# Patient Record
Sex: MALE | Race: WHITE | URBAN - METROPOLITAN AREA
[De-identification: names, ages, dates, MRNs, and addresses within clinical notes are randomized per-mention and may not be internally consistent; named-entity substitution may affect disease eponyms.]

---

## 2021-06-28 ENCOUNTER — OUTPATIENT (OUTPATIENT)
Dept: OUTPATIENT SERVICES | Facility: HOSPITAL | Age: 56
LOS: 1 days | End: 2021-06-28
Payer: OTHER MISCELLANEOUS

## 2021-06-28 VITALS
DIASTOLIC BLOOD PRESSURE: 83 MMHG | OXYGEN SATURATION: 99 % | WEIGHT: 173.06 LBS | SYSTOLIC BLOOD PRESSURE: 125 MMHG | HEIGHT: 67 IN | TEMPERATURE: 98 F | RESPIRATION RATE: 18 BRPM | HEART RATE: 65 BPM

## 2021-06-28 DIAGNOSIS — Z01.818 ENCOUNTER FOR OTHER PREPROCEDURAL EXAMINATION: ICD-10-CM

## 2021-06-28 DIAGNOSIS — Z98.890 OTHER SPECIFIED POSTPROCEDURAL STATES: Chronic | ICD-10-CM

## 2021-06-28 DIAGNOSIS — M75.122 COMPLETE ROTATOR CUFF TEAR OR RUPTURE OF LEFT SHOULDER, NOT SPECIFIED AS TRAUMATIC: ICD-10-CM

## 2021-06-28 DIAGNOSIS — Z90.49 ACQUIRED ABSENCE OF OTHER SPECIFIED PARTS OF DIGESTIVE TRACT: Chronic | ICD-10-CM

## 2021-06-28 LAB
ADD ON TEST-SPECIMEN IN LAB: SIGNIFICANT CHANGE UP
ANION GAP SERPL CALC-SCNC: 6 MMOL/L — SIGNIFICANT CHANGE UP (ref 5–17)
BASOPHILS # BLD AUTO: 0.06 K/UL — SIGNIFICANT CHANGE UP (ref 0–0.2)
BASOPHILS NFR BLD AUTO: 0.8 % — SIGNIFICANT CHANGE UP (ref 0–2)
BUN SERPL-MCNC: 17 MG/DL — SIGNIFICANT CHANGE UP (ref 7–23)
CALCIUM SERPL-MCNC: 9 MG/DL — SIGNIFICANT CHANGE UP (ref 8.5–10.1)
CHLORIDE SERPL-SCNC: 110 MMOL/L — HIGH (ref 96–108)
CO2 SERPL-SCNC: 23 MMOL/L — SIGNIFICANT CHANGE UP (ref 22–31)
CREAT SERPL-MCNC: 0.97 MG/DL — SIGNIFICANT CHANGE UP (ref 0.5–1.3)
EOSINOPHIL # BLD AUTO: 0.31 K/UL — SIGNIFICANT CHANGE UP (ref 0–0.5)
EOSINOPHIL NFR BLD AUTO: 4 % — SIGNIFICANT CHANGE UP (ref 0–6)
GLUCOSE SERPL-MCNC: 88 MG/DL — SIGNIFICANT CHANGE UP (ref 70–99)
HCT VFR BLD CALC: 48.2 % — SIGNIFICANT CHANGE UP (ref 39–50)
HGB BLD-MCNC: 16.3 G/DL — SIGNIFICANT CHANGE UP (ref 13–17)
IMM GRANULOCYTES NFR BLD AUTO: 0.5 % — SIGNIFICANT CHANGE UP (ref 0–1.5)
LYMPHOCYTES # BLD AUTO: 2.19 K/UL — SIGNIFICANT CHANGE UP (ref 1–3.3)
LYMPHOCYTES # BLD AUTO: 28.3 % — SIGNIFICANT CHANGE UP (ref 13–44)
MCHC RBC-ENTMCNC: 29.4 PG — SIGNIFICANT CHANGE UP (ref 27–34)
MCHC RBC-ENTMCNC: 33.8 GM/DL — SIGNIFICANT CHANGE UP (ref 32–36)
MCV RBC AUTO: 86.8 FL — SIGNIFICANT CHANGE UP (ref 80–100)
MONOCYTES # BLD AUTO: 0.59 K/UL — SIGNIFICANT CHANGE UP (ref 0–0.9)
MONOCYTES NFR BLD AUTO: 7.6 % — SIGNIFICANT CHANGE UP (ref 2–14)
NEUTROPHILS # BLD AUTO: 4.54 K/UL — SIGNIFICANT CHANGE UP (ref 1.8–7.4)
NEUTROPHILS NFR BLD AUTO: 58.8 % — SIGNIFICANT CHANGE UP (ref 43–77)
PLATELET # BLD AUTO: 206 K/UL — SIGNIFICANT CHANGE UP (ref 150–400)
POTASSIUM SERPL-MCNC: 4 MMOL/L — SIGNIFICANT CHANGE UP (ref 3.5–5.3)
POTASSIUM SERPL-SCNC: 4 MMOL/L — SIGNIFICANT CHANGE UP (ref 3.5–5.3)
RBC # BLD: 5.55 M/UL — SIGNIFICANT CHANGE UP (ref 4.2–5.8)
RBC # FLD: 13.4 % — SIGNIFICANT CHANGE UP (ref 10.3–14.5)
SODIUM SERPL-SCNC: 139 MMOL/L — SIGNIFICANT CHANGE UP (ref 135–145)
WBC # BLD: 7.73 K/UL — SIGNIFICANT CHANGE UP (ref 3.8–10.5)
WBC # FLD AUTO: 7.73 K/UL — SIGNIFICANT CHANGE UP (ref 3.8–10.5)

## 2021-06-28 PROCEDURE — 71046 X-RAY EXAM CHEST 2 VIEWS: CPT

## 2021-06-28 PROCEDURE — 80076 HEPATIC FUNCTION PANEL: CPT

## 2021-06-28 PROCEDURE — 93005 ELECTROCARDIOGRAM TRACING: CPT

## 2021-06-28 PROCEDURE — G0463: CPT | Mod: 25

## 2021-06-28 PROCEDURE — 85025 COMPLETE CBC W/AUTO DIFF WBC: CPT

## 2021-06-28 PROCEDURE — 36415 COLL VENOUS BLD VENIPUNCTURE: CPT

## 2021-06-28 PROCEDURE — 80048 BASIC METABOLIC PNL TOTAL CA: CPT

## 2021-06-28 PROCEDURE — 93010 ELECTROCARDIOGRAM REPORT: CPT

## 2021-06-28 PROCEDURE — 71046 X-RAY EXAM CHEST 2 VIEWS: CPT | Mod: 26

## 2021-06-28 NOTE — H&P PST ADULT - HISTORY OF PRESENT ILLNESS
57 y/o male with left torn rotator cuff. Pt reports "I was moving something heavy then my left shoulder started hurting." He takes Tylenol with mild pain relief. He is here for planned left shoulder arthroscopy, rotator cuff repair, open biceps tenodesis.

## 2021-06-28 NOTE — H&P PST ADULT - NSANTHOSAYNRD_GEN_A_CORE
No. MORENITA screening performed.  STOP BANG Legend: 0-2 = LOW Risk; 3-4 = INTERMEDIATE Risk; 5-8 = HIGH Risk

## 2021-06-28 NOTE — H&P PST ADULT - ASSESSMENT
57 y/o male with left torn rotator cuff. Pt reports "I was moving something heavy then my left shoulder started hurting." He takes Tylenol with mild pain relief. He is scheduled for left shoulder arthroscopy, rotator cuff repair, open biceps tenodesis.  57 y/o male with left torn rotator cuff. Pt reports "I was moving something heavy then my left shoulder started hurting." He takes Tylenol with mild pain relief. He is scheduled for left shoulder arthroscopy, rotator cuff repair, open biceps tenodesis.   Plan  1. Stop all NSAIDS, herbal supplements and vitamins for 7 days.  2. NPO as per ASU instructions.  3. COVID test on 07/03/2021, pt instructed.   4. Use EZ sponges as directed  5. Labs, EKG, CXR as per surgeon.   6. PMD visit for optimization prior to surgery as per surgeon

## 2021-06-28 NOTE — H&P PST ADULT - NSICDXPASTSURGICALHX_GEN_ALL_CORE_FT
PAST SURGICAL HISTORY:  H/O hernia repair b/l inguinal and umbilical hernia repair    H/O sinus surgery     S/P appendectomy

## 2021-06-29 DIAGNOSIS — M75.122 COMPLETE ROTATOR CUFF TEAR OR RUPTURE OF LEFT SHOULDER, NOT SPECIFIED AS TRAUMATIC: ICD-10-CM

## 2021-06-29 DIAGNOSIS — Z01.818 ENCOUNTER FOR OTHER PREPROCEDURAL EXAMINATION: ICD-10-CM

## 2021-07-02 DIAGNOSIS — Z01.818 ENCOUNTER FOR OTHER PREPROCEDURAL EXAMINATION: ICD-10-CM

## 2021-07-03 ENCOUNTER — APPOINTMENT (OUTPATIENT)
Dept: DISASTER EMERGENCY | Facility: CLINIC | Age: 56
End: 2021-07-03

## 2021-07-04 LAB — SARS-COV-2 N GENE NPH QL NAA+PROBE: NOT DETECTED

## 2021-07-06 ENCOUNTER — OUTPATIENT (OUTPATIENT)
Dept: INPATIENT UNIT | Facility: HOSPITAL | Age: 56
LOS: 1 days | Discharge: HOME | End: 2021-07-06
Payer: OTHER MISCELLANEOUS

## 2021-07-06 ENCOUNTER — RESULT REVIEW (OUTPATIENT)
Age: 56
End: 2021-07-06

## 2021-07-06 VITALS
OXYGEN SATURATION: 97 % | SYSTOLIC BLOOD PRESSURE: 115 MMHG | RESPIRATION RATE: 15 BRPM | WEIGHT: 173.06 LBS | HEIGHT: 67 IN | HEART RATE: 69 BPM | TEMPERATURE: 98 F | DIASTOLIC BLOOD PRESSURE: 67 MMHG

## 2021-07-06 VITALS
RESPIRATION RATE: 19 BRPM | OXYGEN SATURATION: 94 % | DIASTOLIC BLOOD PRESSURE: 109 MMHG | TEMPERATURE: 97 F | HEART RATE: 77 BPM | SYSTOLIC BLOOD PRESSURE: 142 MMHG

## 2021-07-06 DIAGNOSIS — Z98.890 OTHER SPECIFIED POSTPROCEDURAL STATES: Chronic | ICD-10-CM

## 2021-07-06 DIAGNOSIS — M75.122 COMPLETE ROTATOR CUFF TEAR OR RUPTURE OF LEFT SHOULDER, NOT SPECIFIED AS TRAUMATIC: ICD-10-CM

## 2021-07-06 DIAGNOSIS — Z90.49 ACQUIRED ABSENCE OF OTHER SPECIFIED PARTS OF DIGESTIVE TRACT: Chronic | ICD-10-CM

## 2021-07-06 PROCEDURE — 29826 SHO ARTHRS SRG DECOMPRESSION: CPT | Mod: AS

## 2021-07-06 PROCEDURE — 29827 SHO ARTHRS SRG RT8TR CUF RPR: CPT | Mod: AS

## 2021-07-06 PROCEDURE — C1713: CPT

## 2021-07-06 PROCEDURE — 88304 TISSUE EXAM BY PATHOLOGIST: CPT

## 2021-07-06 PROCEDURE — 88304 TISSUE EXAM BY PATHOLOGIST: CPT | Mod: 26

## 2021-07-06 PROCEDURE — 29823 SHO ARTHRS SRG XTNSV DBRDMT: CPT | Mod: AS

## 2021-07-06 RX ORDER — ONDANSETRON 8 MG/1
4 TABLET, FILM COATED ORAL ONCE
Refills: 0 | Status: COMPLETED | OUTPATIENT
Start: 2021-07-06 | End: 2021-07-06

## 2021-07-06 RX ORDER — SODIUM CHLORIDE 9 MG/ML
1000 INJECTION, SOLUTION INTRAVENOUS
Refills: 0 | Status: DISCONTINUED | OUTPATIENT
Start: 2021-07-06 | End: 2021-07-06

## 2021-07-06 RX ORDER — OXYCODONE HYDROCHLORIDE 5 MG/1
5 TABLET ORAL ONCE
Refills: 0 | Status: DISCONTINUED | OUTPATIENT
Start: 2021-07-06 | End: 2021-07-06

## 2021-07-06 RX ORDER — ASPIRIN/CALCIUM CARB/MAGNESIUM 324 MG
1 TABLET ORAL
Qty: 14 | Refills: 0
Start: 2021-07-06 | End: 2021-07-19

## 2021-07-06 RX ORDER — OXYCODONE HYDROCHLORIDE 5 MG/1
10 TABLET ORAL ONCE
Refills: 0 | Status: DISCONTINUED | OUTPATIENT
Start: 2021-07-06 | End: 2021-07-06

## 2021-07-06 RX ORDER — FENTANYL CITRATE 50 UG/ML
50 INJECTION INTRAVENOUS
Refills: 0 | Status: DISCONTINUED | OUTPATIENT
Start: 2021-07-06 | End: 2021-07-06

## 2021-07-06 RX ADMIN — SODIUM CHLORIDE 125 MILLILITER(S): 9 INJECTION, SOLUTION INTRAVENOUS at 17:53

## 2021-07-06 RX ADMIN — FENTANYL CITRATE 50 MICROGRAM(S): 50 INJECTION INTRAVENOUS at 18:23

## 2021-07-06 RX ADMIN — OXYCODONE HYDROCHLORIDE 10 MILLIGRAM(S): 5 TABLET ORAL at 18:59

## 2021-07-06 RX ADMIN — OXYCODONE HYDROCHLORIDE 10 MILLIGRAM(S): 5 TABLET ORAL at 17:51

## 2021-07-06 RX ADMIN — FENTANYL CITRATE 50 MICROGRAM(S): 50 INJECTION INTRAVENOUS at 17:50

## 2021-07-06 RX ADMIN — FENTANYL CITRATE 50 MICROGRAM(S): 50 INJECTION INTRAVENOUS at 18:59

## 2021-07-06 RX ADMIN — FENTANYL CITRATE 50 MICROGRAM(S): 50 INJECTION INTRAVENOUS at 18:08

## 2021-07-06 RX ADMIN — ONDANSETRON 4 MILLIGRAM(S): 8 TABLET, FILM COATED ORAL at 19:08

## 2021-07-06 NOTE — ASU PATIENT PROFILE, ADULT - PSH
H/O hernia repair  b/l inguinal and umbilical hernia repair  H/O sinus surgery    S/P appendectomy

## 2021-07-06 NOTE — ASU DISCHARGE PLAN (ADULT/PEDIATRIC) - ASU DC SPECIAL INSTRUCTIONSFT
Shoulder Arthroscopy Instructions     1) Your Shoulder will swell over the next 48hours and you can expect pain to get a bit worse. Your hand may swell also. Ice your shoulder plenty, continuously if possible. Fill up a plastic bag with ice, then wrap it in a towel or pillow case.     2) ACTIVITY: Elevate your hand and wiggle your fingers often (10x per hour). NO LIFTING ANYTHING with the arm. Be up and walking as much as you can tolerate. The more you move the better you will do.     3) BANDAGE: Expect some mild bloody drainage. It is normal. It may soak through the gauze and ACE bandage. This is mostly leftover Saline fluid coming out from surgery.     4) SHOWER: Remove bandage in 48hrs and place a Waterproof Band Aide over each of the 3 arthroscopy incisions. You can shower in 48 hours. DO NOT REMOVE BANDAGE OVER BICEP TENODESIS (linear over front of shoulder). No bath. Pat your incisions dry. No creams, no lotions.    5) Only reason to worry would be if pain got so severe that you cannot feel or wiggle your fingers. In this case you need to call or come to the ER. But as long as you can feel and wiggle your fingers, you are fine.    6) Call the office to schedule a follow up appointment to see Dr. BOATENG in 10-14 days. Your Sutures will be removed at that point.    7) A pain Rx is in the chart; fill it on your way home. OR was sent electronically to your pharmacy, pick it up on the way home.    8) Take a full Aspirn EC 325mg daily for 2 weeks. This is to prevent blood clots.

## 2021-07-06 NOTE — BRIEF OPERATIVE NOTE - NSICDXBRIEFPROCEDURE_GEN_ALL_CORE_FT
PROCEDURES:  Left shoulder arthroscopy 06-Jul-2021 17:45:45 with subacromial decompression, rotator cuff repair and open bicep tenodesis Ronda Zarco

## 2021-07-06 NOTE — ASU PREOP CHECKLIST - LATEX ALLERGY
----- Message from Zheng Garcia RN sent at 11/30/2015 11:38 AM CST -----  Regarding: CLN recall  5 year CLN recall, per Dr. Db Gamez done 11/11/15 no

## 2021-07-11 DIAGNOSIS — M75.42 IMPINGEMENT SYNDROME OF LEFT SHOULDER: ICD-10-CM

## 2021-07-11 DIAGNOSIS — S46.012A STRAIN OF MUSCLE(S) AND TENDON(S) OF THE ROTATOR CUFF OF LEFT SHOULDER, INITIAL ENCOUNTER: ICD-10-CM

## 2021-07-11 DIAGNOSIS — F17.210 NICOTINE DEPENDENCE, CIGARETTES, UNCOMPLICATED: ICD-10-CM

## 2021-07-11 DIAGNOSIS — F10.21 ALCOHOL DEPENDENCE, IN REMISSION: ICD-10-CM
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
## 2021-07-11 DIAGNOSIS — S43.432A SUPERIOR GLENOID LABRUM LESION OF LEFT SHOULDER, INITIAL ENCOUNTER: ICD-10-CM
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
## 2021-07-11 DIAGNOSIS — S46.212A STRAIN OF MUSCLE, FASCIA AND TENDON OF OTHER PARTS OF BICEPS, LEFT ARM, INITIAL ENCOUNTER: ICD-10-CM
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
## 2021-07-15 ENCOUNTER — EMERGENCY (EMERGENCY)
Facility: HOSPITAL | Age: 56
LOS: 0 days | Discharge: HOME | End: 2021-07-15
Attending: EMERGENCY MEDICINE
Payer: OTHER MISCELLANEOUS

## 2021-07-15 VITALS
RESPIRATION RATE: 18 BRPM | OXYGEN SATURATION: 95 % | DIASTOLIC BLOOD PRESSURE: 90 MMHG | SYSTOLIC BLOOD PRESSURE: 129 MMHG | HEART RATE: 75 BPM | TEMPERATURE: 98 F

## 2021-07-15 VITALS — WEIGHT: 175.05 LBS | HEIGHT: 67 IN

## 2021-07-15 DIAGNOSIS — F17.200 NICOTINE DEPENDENCE, UNSPECIFIED, UNCOMPLICATED: ICD-10-CM

## 2021-07-15 DIAGNOSIS — Z98.890 OTHER SPECIFIED POSTPROCEDURAL STATES: ICD-10-CM

## 2021-07-15 DIAGNOSIS — Z90.49 ACQUIRED ABSENCE OF OTHER SPECIFIED PARTS OF DIGESTIVE TRACT: Chronic | ICD-10-CM

## 2021-07-15 DIAGNOSIS — Z48.02 ENCOUNTER FOR REMOVAL OF SUTURES: ICD-10-CM

## 2021-07-15 DIAGNOSIS — Z98.890 OTHER SPECIFIED POSTPROCEDURAL STATES: Chronic | ICD-10-CM

## 2021-07-15 DIAGNOSIS — Z88.5 ALLERGY STATUS TO NARCOTIC AGENT: ICD-10-CM

## 2021-07-15 DIAGNOSIS — M75.100 UNSPECIFIED ROTATOR CUFF TEAR OR RUPTURE OF UNSPECIFIED SHOULDER, NOT SPECIFIED AS TRAUMATIC: ICD-10-CM

## 2021-07-15 DIAGNOSIS — Z90.49 ACQUIRED ABSENCE OF OTHER SPECIFIED PARTS OF DIGESTIVE TRACT: ICD-10-CM

## 2021-07-15 PROBLEM — F10.20 ALCOHOL DEPENDENCE, UNCOMPLICATED: Chronic | Status: ACTIVE | Noted: 2021-06-28

## 2021-07-15 PROCEDURE — 99053 MED SERV 10PM-8AM 24 HR FAC: CPT

## 2021-07-15 PROCEDURE — 99282 EMERGENCY DEPT VISIT SF MDM: CPT

## 2021-07-15 PROCEDURE — G0463: CPT

## 2021-07-15 NOTE — ED PROVIDER NOTE - OBJECTIVE STATEMENT
57 yo m presents to ed for suture removal, pt has left shoulder RCR and bicep tenodesis on 7/6/21 and presents to see ortho for suture removal.  no fever, chills, no erythema

## 2021-07-15 NOTE — ED PROVIDER NOTE - CARE PROVIDER_API CALL
Mauro Ag (MD)  Orthopaedic Surgery  [address] [place] [address] [place] [address]  Phone: [phone]  Fax: [phone]  Follow Up Time: Routine

## 2021-07-15 NOTE — ED PROVIDER NOTE - PHYSICAL EXAMINATION
Constitutional: NAD AAOx3  Eyes: PERRLA EOMI  Head: Normocephalic atraumatic  Mouth: MMM  Cardiac: regular rate   Resp: Lungs CTAB  GI: Abd s/nt/nd, no rebound or guarding.  Neuro: awake, alert, moving all extremities, cranial nerves 2-12 intact, sensation intact, no dysmetria.  msk left shoulderr with sutures from prior surgery, dry clean, no evidence of infection, no erythema  Skin: No rashes

## 2021-07-15 NOTE — ED PROVIDER NOTE - PATIENT PORTAL LINK FT
You can access the FollowMyHealth Patient Portal offered by [facility] by registering at the following website: [email or web address]. By joining [facility]’s FollowMyHealth portal, you will also be able to view your health information using other applications (apps) compatible with our system.

## 2021-07-15 NOTE — ED ADULT NURSE NOTE - CHIEF COMPLAINT QUOTE
sent in by dr. davies to have suture removal from surgical site, s/p left rotator cuff repair with reattachment of bicep tendon, c/o left shoulder discomfort, denies fever

## 2021-07-15 NOTE — ED PROVIDER NOTE - CHIEF COMPLAINT
Patient calling stating that she has not had any periods in the last 4 months along with hair loss and dry skin and wanted to know if she is going through menopause. Patient has an upcoming appt for her AE and patient advised that there are several factors that could cause hair loss, dry skin which one of them could be menopause. Patient encouraged to talk with TP at her AE appt to determine if she is in menopause. Patient appreciative of help.
The patient is a 56y Male complaining of suture/staple removal.

## 2021-07-15 NOTE — ED PROVIDER NOTE - NS ED ROS FT
Constitutional: No fever or chills  Eyes: No visual changes  HEENT: No throat pain  CV: No chest pain  Resp: No SOB no cough  GI: No abd pain, nausea or vomiting  : No dysuria  MSK: No musculoskeletal pain  Skin: No rash, + sutures  Neuro: No headache

## 2021-07-15 NOTE — CONSULT NOTE ADULT - SUBJECTIVE AND OBJECTIVE BOX
Patient is a 56yMale RHD s/p L shoulder RCR and bicep tenodesis 7/6/21. Pt has reported no complaints or complications. No drainages from incision site.       Rotator cuff tear    Alcoholism    Smoker      Vicodin (Pruritus)      PHYSICAL EXAM:  T(C): 36.6 (07-15-21 @ 08:10), Max: 36.6 (07-15-21 @ 08:10)  HR: 75 (07-15-21 @ 08:10) (75 - 75)  BP: 129/90 (07-15-21 @ 08:10) (129/90 - 129/90)  RR: 18 (07-15-21 @ 08:10) (18 - 18)  SpO2: 95% (07-15-21 @ 08:10) (95% - 95%)    Gen: NAD, Resting comfortably  LEFT Upper Extremity:   Surgical incisions with nylon sutures, well healed/approximated, Pirineo dressing over axilla well healed/approximated.  NTTP over the bony prominences of the shoulder/elbow/wrist/hand/fingers  Painless passive/active ROM of the shoulder/elbow/wrist/hand/fingers  C5-T1 SILT  Motor grossly intact throughout axillary/musculocutaneous/radial/median/ulnar/AIN/PIN nerves  + radial pulse  Compartments soft and compressible        A/P: 56M s/p L shoulder RCR and bicep tenodesis    Nylon sutures removed, pirineo dressing removed will incisions well healed with no drainage/signs of infection  WBAT LUE, may use sling while oob until FU with Dr Ag.   PT/OT  No acute orthopedic surgical intervention indicated at this time  Orthopedically stable for discharge, FU with Dr Ag for scheduled appt. Call office  Discussed with attending who is in agreement with above plan

## 2021-07-15 NOTE — ED ADULT NURSE NOTE - OBJECTIVE STATEMENT
Pt reports that he was told to come to ED for suture removal. Pt had left shoulder surgery with Dr. Ag. Reports incisions are well healing at this time and denies any drainage, bleeding, or pain. Ortho to bedside for suture removal and pt ok to be d/c at this time.  +PPx4.

## 2021-10-14 NOTE — H&P PST ADULT - BP NONINVASIVE MEAN (MM HG)
97 Complex Repair And Ftsg Text: The defect edges were debeveled with a #15 scalpel blade.  The primary defect was closed partially with a complex linear closure.  Given the location of the defect, shape of the defect and the proximity to free margins a full thickness skin graft was deemed most appropriate to repair the remaining defect.  The graft was trimmed to fit the size of the remaining defect.  The graft was then placed in the primary defect, oriented appropriately, and sutured into place.

## 2022-10-07 NOTE — H&P PST ADULT - NS HIV RISK FACTOR
Systems Review/Patient History Form    As you review the following list, please check any of those problems which have significantly affected you.    Constitutional  [] Recent weight gain amount  [] Recent weight loss amount  [] Fatigue  [] Weakness  [] Fever    Eyes  [] Pain  [x] Redness  [] Loss of vision  [] Double or blurred vision  [] Dryness  [] Feels like something in eye  [] Itching eyes    Ears/Nose/Mouth/Throat  [] Ringing in ears  [] Loss of hearing  [] Nosebleeds  [] Loss of smell  [] Dryness in nose  [] Runny nose  [] Sore tongue  [] Bleeding gums  [] Sores in mouth  [] Loss of taste  [] Dryness of mouth  [] Frequent sore throats  [] Hoarseness  [] Difficulty in swallowing    Cardiovascular  [] Pain in chest  [] Irregular heart beat  [] Sudden changes in heart beat  [] High blood pressure  [] Heart murmurs    Respiratory  [] Shortness of breath  [] Difficulty in breathing at night  [x] Swollen legs or feet  [] Cough  [] Coughing of blood  [] Wheezing (asthma)    Gastrointestinal  [] Nausea  [] Vomiting of blood or coffee ground material  [] Stomach pain relieved by food or milk  [] Jaundice  [] Increasing constipation  [] Persistent diarrhea  [] Blood in stools  [] Black stools  [] Heartburn    Genitourinary  [] Difficult urination  [] Pain or burning on urination  [] Blood in urine  [] Cloudy, \"smoky\" urine  [] Pus in urine  [] Discharge from penis/vagina  [] Getting up at night to pass urine  [] Vaginal dryness  [] Rash/ulcers  [] Sexual difficulties  [] Prostate trouble    Musculoskeletal  [x] Morning stiffness - lasting how long? - 30 minutes  [] Joint pain  [] Muscle weakness  [] Muscle tenderness  [] Joint swelling - Joints affected in last 6 months:    Integumentary (skin and/or breast)  [] Easy bruising  [] Redness  [] Rash  [] Hives  [] Sun sensitive (sun allergy)  [] Tightness  [] Nodules/bumps  [] Hair loss  [] Color changes of hands or feet in the cold    Neurological System  []  Headaches  [] Dizziness  [] Fainting  [] Muscle spasm  [] Loss of consciousness  [] Sensitivity or pain of hands and/or feet  [] Memory loss  [] Night sweats    Psychiatric  [] Excessive worries  [] Anxiety  [] Easily losing temper  [] Depression  [] Agitation  [] Difficulty falling asleep  [] Difficulty staying asleep    Endocrine  [] Excessive thirst    Hematologic/Lymphatic  [] Swollen glands  [] Tender glands  [] Anemia  [] Bleeding tendency  [] Transfusion/when:    Allergic/Immunologic  [] Frequent sneezing  [] Increased susceptibility to infection    Because of health problems, do you have difficulty:  Using your hands to grasp small objects (buttons, toothbrush, pencil, etc.)? No  Walking? No  Climbing stairs? No  Descending stairs? No  Sitting down? No  Getting up from chair? No  Touching your feet while seated? No  Reaching behind your back? No  Reaching behind your head? No  Dressing yourself? No  Going to sleep? No  Staying asleep due to pain? No  Obtaining restful sleep? No  Bathing? No  Eating? No  Working? No  Getting along with family members? No  In your sexual relationship? No  Engaging in leisure time activities? No  With morning stiffness? No  Do you use a mobility device? No mobility device used.        No

## 2022-12-14 ENCOUNTER — NON-APPOINTMENT (OUTPATIENT)
Age: 57
End: 2022-12-14

## 2022-12-14 DIAGNOSIS — M75.122 COMPLETE ROTATOR CUFF TEAR OR RUPTURE OF LEFT SHOULDER, NOT SPECIFIED AS TRAUMATIC: ICD-10-CM

## 2022-12-14 DIAGNOSIS — M75.22 BICIPITAL TENDINITIS, LEFT SHOULDER: ICD-10-CM

## 2022-12-14 DIAGNOSIS — F17.200 NICOTINE DEPENDENCE, UNSPECIFIED, UNCOMPLICATED: ICD-10-CM

## 2022-12-14 DIAGNOSIS — M75.41 IMPINGEMENT SYNDROME OF RIGHT SHOULDER: ICD-10-CM

## 2022-12-14 DIAGNOSIS — S43.432A SUPERIOR GLENOID LABRUM LESION OF LEFT SHOULDER, INITIAL ENCOUNTER: ICD-10-CM

## 2022-12-14 DIAGNOSIS — M75.42 IMPINGEMENT SYNDROME OF LEFT SHOULDER: ICD-10-CM

## 2022-12-14 RX ORDER — SULINDAC 200 MG/1
200 TABLET ORAL TWICE DAILY
Refills: 0 | Status: ACTIVE | COMMUNITY

## 2022-12-14 RX ORDER — MELOXICAM 15 MG/1
15 TABLET ORAL DAILY
Refills: 0 | Status: ACTIVE | COMMUNITY

## 2023-12-01 NOTE — ED ADULT TRIAGE NOTE - CHIEF COMPLAINT QUOTE
Detail Level: Zone
Continue Regimen: [employer] compound Rx Fluorouracil 5% Calcipotriene 0.005% to chest
Render In Strict Bullet Format?: No
Plan: completed [employer] compound Rx Fluorouracil 5% Calcipotriene 0.005% to yesterday. Appropriate treatment response, erythema and crusting, start vaseline prn and aleovera. May treat temples, nose, and upper cutaneous lip after the holiday. Aware to avoid forehead (recent mohs site) for at least 3 months post mohs. Pt to FU or call with any questions.
sent in by dr. davies to have suture removal from surgical site, s/p left rotator cuff repair with reattachment of bicep tendon, c/o left shoulder discomfort, denies fever

## 2024-08-20 ENCOUNTER — OFFICE (OUTPATIENT)
Dept: URBAN - METROPOLITAN AREA CLINIC 115 | Facility: CLINIC | Age: 59
Setting detail: OPHTHALMOLOGY
End: 2024-08-20
Payer: COMMERCIAL

## 2024-08-20 DIAGNOSIS — S05.31XA: ICD-10-CM

## 2024-08-20 PROCEDURE — 92004 COMPRE OPH EXAM NEW PT 1/>: CPT | Performed by: OPHTHALMOLOGY

## 2024-08-20 ASSESSMENT — CONFRONTATIONAL VISUAL FIELD TEST (CVF)
OS_FINDINGS: FULL
OD_FINDINGS: FULL

## 2024-08-23 ENCOUNTER — OFFICE (OUTPATIENT)
Dept: URBAN - METROPOLITAN AREA CLINIC 115 | Facility: CLINIC | Age: 59
Setting detail: OPHTHALMOLOGY
End: 2024-08-23
Payer: COMMERCIAL

## 2024-08-23 DIAGNOSIS — S05.32XD: ICD-10-CM

## 2024-08-23 PROCEDURE — 99212 OFFICE O/P EST SF 10 MIN: CPT | Performed by: OPTOMETRIST

## 2024-08-23 ASSESSMENT — CONFRONTATIONAL VISUAL FIELD TEST (CVF)
OD_FINDINGS: FULL
OS_FINDINGS: FULL